# Patient Record
Sex: FEMALE | Race: WHITE | NOT HISPANIC OR LATINO | Employment: OTHER | URBAN - METROPOLITAN AREA
[De-identification: names, ages, dates, MRNs, and addresses within clinical notes are randomized per-mention and may not be internally consistent; named-entity substitution may affect disease eponyms.]

---

## 2023-10-11 PROBLEM — N18.30 CHRONIC KIDNEY DISEASE (CKD), STAGE III (MODERATE) (MULTI): Status: ACTIVE | Noted: 2023-10-11

## 2023-10-11 PROBLEM — B36.9 SUPERFICIAL FUNGAL INFECTION OF SKIN: Status: ACTIVE | Noted: 2023-10-11

## 2023-10-11 PROBLEM — I10 HYPERTENSION: Status: ACTIVE | Noted: 2023-10-11

## 2023-10-11 PROBLEM — R92.8 ABNORMAL FINDING ON MAMMOGRAPHY: Status: ACTIVE | Noted: 2023-10-11

## 2023-10-11 PROBLEM — M54.50 LOW BACK PAIN: Status: ACTIVE | Noted: 2023-10-11

## 2023-10-11 PROBLEM — M51.369 LUMBAR DEGENERATIVE DISC DISEASE: Status: ACTIVE | Noted: 2023-10-11

## 2023-10-11 PROBLEM — M41.80 LEVOSCOLIOSIS: Status: ACTIVE | Noted: 2023-10-11

## 2023-10-11 PROBLEM — M10.9 GOUT: Status: ACTIVE | Noted: 2023-10-11

## 2023-10-11 PROBLEM — R53.83 FATIGUE: Status: ACTIVE | Noted: 2023-10-11

## 2023-10-11 PROBLEM — M51.36 LUMBAR DEGENERATIVE DISC DISEASE: Status: ACTIVE | Noted: 2023-10-11

## 2023-10-11 RX ORDER — LOSARTAN POTASSIUM 100 MG/1
100 TABLET ORAL
COMMUNITY

## 2023-10-11 RX ORDER — CYCLOBENZAPRINE HCL 5 MG
1 TABLET ORAL NIGHTLY
COMMUNITY
End: 2023-10-12 | Stop reason: WASHOUT

## 2023-10-11 RX ORDER — POTASSIUM CITRATE 10 MEQ/1
10 TABLET, EXTENDED RELEASE ORAL
COMMUNITY

## 2023-10-11 RX ORDER — NYSTATIN 100000 U/G
CREAM TOPICAL 3 TIMES DAILY
COMMUNITY
Start: 2022-06-21

## 2023-10-12 ENCOUNTER — OFFICE VISIT (OUTPATIENT)
Dept: CARDIOLOGY | Facility: CLINIC | Age: 78
End: 2023-10-12
Payer: MEDICARE

## 2023-10-12 VITALS
WEIGHT: 150 LBS | HEART RATE: 95 BPM | HEIGHT: 67 IN | SYSTOLIC BLOOD PRESSURE: 122 MMHG | BODY MASS INDEX: 23.54 KG/M2 | DIASTOLIC BLOOD PRESSURE: 80 MMHG

## 2023-10-12 DIAGNOSIS — I49.1 PREMATURE ATRIAL BEAT: Primary | ICD-10-CM

## 2023-10-12 PROCEDURE — 99204 OFFICE O/P NEW MOD 45 MIN: CPT | Performed by: INTERNAL MEDICINE

## 2023-10-12 PROCEDURE — 3074F SYST BP LT 130 MM HG: CPT | Performed by: INTERNAL MEDICINE

## 2023-10-12 PROCEDURE — 3079F DIAST BP 80-89 MM HG: CPT | Performed by: INTERNAL MEDICINE

## 2023-10-12 PROCEDURE — 1159F MED LIST DOCD IN RCRD: CPT | Performed by: INTERNAL MEDICINE

## 2023-10-12 PROCEDURE — 1036F TOBACCO NON-USER: CPT | Performed by: INTERNAL MEDICINE

## 2023-10-12 RX ORDER — SODIUM BICARBONATE 650 MG/1
650 TABLET ORAL 2 TIMES DAILY
COMMUNITY

## 2023-10-12 NOTE — LETTER
October 13, 2023     Rick Barfield MD  [address]  [facility] [address]    Patient: Chelsea Steen   YOB: 1945   Date of Visit: 10/12/2023       Dear Dr. Rick Barfield MD:    Thank you for referring Chelsea Steen to me for evaluation. Below are my notes for this consultation.  If you have questions, please do not hesitate to call me. I look forward to following your patient along with you.       Sincerely,     Hernan Rodrigues MD      CC: Giovany Cerna MD  ______________________________________________________________________________________    Electrophysiology New Patient Referral    Chelsea Steen is a 78 y.o. year old female patient with CKD stage 3 and palpitations. She recently was seen by her PCP and was noted to have missed beat and was referred to Dr More who ordered monitor which showed elevated PVC and PAC burden.     Patient here for evaluation for abnormal monitor results. She reports she has been feeling well though does notice her heart beat when she lies down at night and in the morning. She denies feeling like her heart  is skipping or racing. She also denies lightheadedness, syncope, or near syncope.     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 06/11/2014    History of carcinoma in situ of breast   • Personal history of other benign neoplasm 06/11/2014    History of uterine leiomyoma      Tobacco Quit 2012, prior 1 packs/day for 30 years, Alcohol Social, Caffeine use  Denies, Drug use  Denies    FamHx:   Family History   Problem Relation Name Age of Onset   • Pneumonia Mother     • Other (CARDIAC DISORDER) Father         No Known Allergies     Outpatient Medications:  Current Outpatient Medications   Medication Instructions   • AMLODIPINE BESYLATE,  "BULK, MISC 2.5 mg, oral, Daily   • cinacalcet HCl (CINACALCET ORAL)    • ergocalciferol, vitamin D2, (VITAMIN D2 ORAL)    • FUROSEMIDE ORAL    • losartan (COZAAR) 100 mg, oral   • nystatin (Mycostatin) cream Topical, 3 times daily, Apply to affected areas 2-3 times daily   • potassium citrate CR (Urocit-K-10) 10 mEq ER tablet 10 mEq, oral, 2 times daily with meals   • sodium bicarbonate 650 mg, oral, 2 times daily         Last Recorded Vitals: .vital      4/27/2021     9:18 AM 5/18/2021     9:07 AM 5/12/2022    11:27 AM 7/14/2022     1:10 PM 5/8/2023     4:10 PM 10/12/2023     1:08 PM   Vitals   Systolic 138 124 136 138 142 122   Diastolic 80 82 74 72 80 80   Heart Rate 63 70 83 72 56 95   Temp 36.5 °C (97.7 °F) 36.7 °C (98 °F) 36.3 °C (97.4 °F) 36.4 °C (97.6 °F) 35.4 °C (95.7 °F)    Resp  6       Height (in) 1.702 m (5' 7\")  1.702 m (5' 7\") 1.702 m (5' 7\") 1.702 m (5' 7\") 1.702 m (5' 7\")   Weight (lb) 178 173.38 171.13 160.25 152.38 150   BMI 27.88 kg/m2 27.15 kg/m2 26.8 kg/m2 25.1 kg/m2 23.87 kg/m2 23.49 kg/m2   BSA (m2) 1.95 m2 1.93 m2 1.92 m2 1.85 m2 1.81 m2 1.79 m2   Visit Report      Report    Visit Vitals  /80   Pulse 95   Ht 1.702 m (5' 7\")   Wt 68 kg (150 lb)   BMI 23.49 kg/m²   Smoking Status Never   BSA 1.79 m²        Physical Exam  Constitutional:       Appearance: Normal appearance.   Cardiovascular:      Rate and Rhythm: Normal rate and regular rhythm.      Pulses: Normal pulses.      Heart sounds: Normal heart sounds.   Pulmonary:      Effort: Pulmonary effort is normal.      Breath sounds: Normal breath sounds.   Musculoskeletal:         General: Normal range of motion.      Right lower leg: No edema.      Left lower leg: No edema.   Skin:     General: Skin is warm and dry.   Neurological:      Mental Status: She is alert and oriented to person, place, and time.   Psychiatric:         Mood and Affect: Mood normal.       My Interpretation of Reviewed Study(s):  Stress Test (September/2023): DSE " which was negative for ischemia with normal LV function of 62%.  Prior ECGs (reviewed and my interpretation): ECG today sinus rhythm with rate of 95 bpm.     Lab Results   Component Value Date    WBC 9.0 05/18/2021    HGB 13.2 05/18/2021    HCT 38.2 05/18/2021     05/18/2021    ALT 12 05/18/2021    AST 13 05/18/2021     05/18/2021    K 3.9 05/18/2021     05/18/2021    CREATININE 1.38 (H) 05/18/2021    BUN 44 (H) 05/18/2021    CO2 22 05/18/2021    TSH 1.87 05/18/2021     Assessment  Patient with abnormal monitor. ECG today sinus rhythm with no ectopy. Recent Dobutamine stress echo showed normal LV function with no ischemia. Monitor showed sinus rhythm with average heart rate of 83 bpm with 7.6% PVC burden and 10.4 %PAC burden. Patient is asymptomatic PACs and PVCs with normal LV function. We provided reassurance. We discussed stopping amlodipine and starting low dose beta blocker to reduce ectopy.  Patient prefers to discuss with nephrology prior to switching to beta blocker.     PLAN  Recommend stopping amlodipine and starting metoprolol suc 25mg daily  Patient will continue to follow up with general cardiology and/or PCP. She can be referred back if further concern arises       Exclusive of any other services or procedures performed, I, Hernan Rodrigues MD   , spent 40 minutes in duration for this visit today.  This time consisted of chart review, obtaining history, and/or performing the exam as documented above as well as documenting the clinical information for the encounter in the electronic record, discussing treatment options, plans, and/or goals with patient, family, and/or caregiver, refilling medications, updating the electronic record, ordering medicines, lab work, imaging, referrals, and/or procedures as documented above and communicating with other [facility] professionals. I have discussed the results of laboratory, radiology, and cardiology studies with the patient and  their family/caregiver.

## 2023-10-12 NOTE — LETTER
October 13, 2023     Rick Barfield MD  [address]  [facility] [address]    Patient: Chelsea Steen   YOB: 1945   Date of Visit: 10/12/2023       Dear Dr. Rick Barfield MD:    Thank you for referring Chelsea Steen to me for evaluation. Below are my notes for this consultation.  If you have questions, please do not hesitate to call me. I look forward to following your patient along with you.       Sincerely,     Hernan Rodrigues MD      CC: No Recipients  ______________________________________________________________________________________    Electrophysiology New Patient Referral    Chelsea Steen is a 78 y.o. year old female patient with CKD stage 3 and palpitations. She recently was seen by her PCP and was noted to have missed beat and was referred to Dr More who ordered monitor which showed elevated PVC and PAC burden.     Patient here for evaluation for abnormal monitor results. She reports she has been feeling well though does notice her heart beat when she lies down at night and in the morning. She denies feeling like her heart  is skipping or racing. She also denies lightheadedness, syncope, or near syncope.     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 06/11/2014    History of carcinoma in situ of breast   • Personal history of other benign neoplasm 06/11/2014    History of uterine leiomyoma      Tobacco Quit 2012, prior 1 packs/day for 30 years, Alcohol Social, Caffeine use  Denies, Drug use  Denies    FamHx:   Family History   Problem Relation Name Age of Onset   • Pneumonia Mother     • Other (CARDIAC DISORDER) Father         No Known Allergies     Outpatient Medications:  Current Outpatient Medications   Medication Instructions   • AMLODIPINE BESYLATE, BULK,  "MISC 2.5 mg, oral, Daily   • cinacalcet HCl (CINACALCET ORAL)    • ergocalciferol, vitamin D2, (VITAMIN D2 ORAL)    • FUROSEMIDE ORAL    • losartan (COZAAR) 100 mg, oral   • nystatin (Mycostatin) cream Topical, 3 times daily, Apply to affected areas 2-3 times daily   • potassium citrate CR (Urocit-K-10) 10 mEq ER tablet 10 mEq, oral, 2 times daily with meals   • sodium bicarbonate 650 mg, oral, 2 times daily         Last Recorded Vitals: .vital      4/27/2021     9:18 AM 5/18/2021     9:07 AM 5/12/2022    11:27 AM 7/14/2022     1:10 PM 5/8/2023     4:10 PM 10/12/2023     1:08 PM   Vitals   Systolic 138 124 136 138 142 122   Diastolic 80 82 74 72 80 80   Heart Rate 63 70 83 72 56 95   Temp 36.5 °C (97.7 °F) 36.7 °C (98 °F) 36.3 °C (97.4 °F) 36.4 °C (97.6 °F) 35.4 °C (95.7 °F)    Resp  6       Height (in) 1.702 m (5' 7\")  1.702 m (5' 7\") 1.702 m (5' 7\") 1.702 m (5' 7\") 1.702 m (5' 7\")   Weight (lb) 178 173.38 171.13 160.25 152.38 150   BMI 27.88 kg/m2 27.15 kg/m2 26.8 kg/m2 25.1 kg/m2 23.87 kg/m2 23.49 kg/m2   BSA (m2) 1.95 m2 1.93 m2 1.92 m2 1.85 m2 1.81 m2 1.79 m2   Visit Report      Report    Visit Vitals  /80   Pulse 95   Ht 1.702 m (5' 7\")   Wt 68 kg (150 lb)   BMI 23.49 kg/m²   Smoking Status Never   BSA 1.79 m²        Physical Exam  Constitutional:       Appearance: Normal appearance.   Cardiovascular:      Rate and Rhythm: Normal rate and regular rhythm.      Pulses: Normal pulses.      Heart sounds: Normal heart sounds.   Pulmonary:      Effort: Pulmonary effort is normal.      Breath sounds: Normal breath sounds.   Musculoskeletal:         General: Normal range of motion.      Right lower leg: No edema.      Left lower leg: No edema.   Skin:     General: Skin is warm and dry.   Neurological:      Mental Status: She is alert and oriented to person, place, and time.   Psychiatric:         Mood and Affect: Mood normal.       My Interpretation of Reviewed Study(s):  Stress Test (September/2023): DSE which " was negative for ischemia with normal LV function of 62%.  Prior ECGs (reviewed and my interpretation): ECG today sinus rhythm with rate of 95 bpm.     Lab Results   Component Value Date    WBC 9.0 05/18/2021    HGB 13.2 05/18/2021    HCT 38.2 05/18/2021     05/18/2021    ALT 12 05/18/2021    AST 13 05/18/2021     05/18/2021    K 3.9 05/18/2021     05/18/2021    CREATININE 1.38 (H) 05/18/2021    BUN 44 (H) 05/18/2021    CO2 22 05/18/2021    TSH 1.87 05/18/2021     Assessment  Patient with abnormal monitor. ECG today sinus rhythm with no ectopy. Recent Dobutamine stress echo showed normal LV function with no ischemia. Monitor showed sinus rhythm with average heart rate of 83 bpm with 7.6% PVC burden and 10.4 %PAC burden. Patient is asymptomatic PACs and PVCs with normal LV function. We provided reassurance. We discussed stopping amlodipine and starting low dose beta blocker to reduce ectopy.  Patient prefers to discuss with nephrology prior to switching to beta blocker.     PLAN  Recommend stopping amlodipine and starting metoprolol suc 25mg daily  Patient will continue to follow up with general cardiology and/or PCP. She can be referred back if further concern arises       Exclusive of any other services or procedures performed, I, Hernan Rodrigues MD   , spent 40 minutes in duration for this visit today.  This time consisted of chart review, obtaining history, and/or performing the exam as documented above as well as documenting the clinical information for the encounter in the electronic record, discussing treatment options, plans, and/or goals with patient, family, and/or caregiver, refilling medications, updating the electronic record, ordering medicines, lab work, imaging, referrals, and/or procedures as documented above and communicating with other [facility] professionals. I have discussed the results of laboratory, radiology, and cardiology studies with the patient and their  family/caregiver.

## 2023-10-12 NOTE — PROGRESS NOTES
Electrophysiology New Patient Referral    Chelsea Steen is a 78 y.o. year old female patient with CKD stage 3 and palpitations. She recently was seen by her PCP and was noted to have missed beat and was referred to Dr More who ordered monitor which showed elevated PVC and PAC burden.     Patient here for evaluation for abnormal monitor results. She reports she has been feeling well though does notice her heart beat when she lies down at night and in the morning. She denies feeling like her heart  is skipping or racing. She also denies lightheadedness, syncope, or near syncope.     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 06/11/2014    History of carcinoma in situ of breast    Personal history of other benign neoplasm 06/11/2014    History of uterine leiomyoma      Tobacco Quit 2012, prior 1 packs/day for 30 years, Alcohol Social, Caffeine use  Denies, Drug use  Denies    FamHx:   Family History   Problem Relation Name Age of Onset    Pneumonia Mother      Other (CARDIAC DISORDER) Father         No Known Allergies     Outpatient Medications:  Current Outpatient Medications   Medication Instructions    AMLODIPINE BESYLATE, BULK, MISC 2.5 mg, oral, Daily    cinacalcet HCl (CINACALCET ORAL)     ergocalciferol, vitamin D2, (VITAMIN D2 ORAL)     FUROSEMIDE ORAL     losartan (COZAAR) 100 mg, oral    nystatin (Mycostatin) cream Topical, 3 times daily, Apply to affected areas 2-3 times daily    potassium citrate CR (Urocit-K-10) 10 mEq ER tablet 10 mEq, oral, 2 times daily with meals    sodium bicarbonate 650 mg, oral, 2 times daily         Last Recorded Vitals: .vital      4/27/2021     9:18 AM 5/18/2021     9:07 AM 5/12/2022    11:27 AM 7/14/2022     1:10 PM 5/8/2023     4:10 PM 10/12/2023     1:08 PM   Vitals   Systolic 138 124 136 138  "142 122   Diastolic 80 82 74 72 80 80   Heart Rate 63 70 83 72 56 95   Temp 36.5 °C (97.7 °F) 36.7 °C (98 °F) 36.3 °C (97.4 °F) 36.4 °C (97.6 °F) 35.4 °C (95.7 °F)    Resp  6       Height (in) 1.702 m (5' 7\")  1.702 m (5' 7\") 1.702 m (5' 7\") 1.702 m (5' 7\") 1.702 m (5' 7\")   Weight (lb) 178 173.38 171.13 160.25 152.38 150   BMI 27.88 kg/m2 27.15 kg/m2 26.8 kg/m2 25.1 kg/m2 23.87 kg/m2 23.49 kg/m2   BSA (m2) 1.95 m2 1.93 m2 1.92 m2 1.85 m2 1.81 m2 1.79 m2   Visit Report      Report    Visit Vitals  /80   Pulse 95   Ht 1.702 m (5' 7\")   Wt 68 kg (150 lb)   BMI 23.49 kg/m²   Smoking Status Never   BSA 1.79 m²        Physical Exam  Constitutional:       Appearance: Normal appearance.   Cardiovascular:      Rate and Rhythm: Normal rate and regular rhythm.      Pulses: Normal pulses.      Heart sounds: Normal heart sounds.   Pulmonary:      Effort: Pulmonary effort is normal.      Breath sounds: Normal breath sounds.   Musculoskeletal:         General: Normal range of motion.      Right lower leg: No edema.      Left lower leg: No edema.   Skin:     General: Skin is warm and dry.   Neurological:      Mental Status: She is alert and oriented to person, place, and time.   Psychiatric:         Mood and Affect: Mood normal.       My Interpretation of Reviewed Study(s):  Stress Test (September/2023): DSE which was negative for ischemia with normal LV function of 62%.  Prior ECGs (reviewed and my interpretation): ECG today sinus rhythm with rate of 95 bpm.     Lab Results   Component Value Date    WBC 9.0 05/18/2021    HGB 13.2 05/18/2021    HCT 38.2 05/18/2021     05/18/2021    ALT 12 05/18/2021    AST 13 05/18/2021     05/18/2021    K 3.9 05/18/2021     05/18/2021    CREATININE 1.38 (H) 05/18/2021    BUN 44 (H) 05/18/2021    CO2 22 05/18/2021    TSH 1.87 05/18/2021     Assessment  Patient with abnormal monitor. ECG today sinus rhythm with no ectopy. Recent Dobutamine stress echo showed normal LV " function with no ischemia. Monitor showed sinus rhythm with average heart rate of 83 bpm with 7.6% PVC burden and 10.4 %PAC burden. Patient is asymptomatic PACs and PVCs with normal LV function. We provided reassurance. We discussed stopping amlodipine and starting low dose beta blocker to reduce ectopy.  Patient prefers to discuss with nephrology prior to switching to beta blocker.     PLAN  Recommend stopping amlodipine and starting metoprolol suc 25mg daily  Patient will continue to follow up with general cardiology and/or PCP. She can be referred back if further concern arises       Exclusive of any other services or procedures performed, IHernan MD   , spent 40 minutes in duration for this visit today.  This time consisted of chart review, obtaining history, and/or performing the exam as documented above as well as documenting the clinical information for the encounter in the electronic record, discussing treatment options, plans, and/or goals with patient, family, and/or caregiver, refilling medications, updating the electronic record, ordering medicines, lab work, imaging, referrals, and/or procedures as documented above and communicating with other [facility] professionals. I have discussed the results of laboratory, radiology, and cardiology studies with the patient and their family/caregiver.

## 2024-03-28 ENCOUNTER — APPOINTMENT (OUTPATIENT)
Dept: PRIMARY CARE | Facility: CLINIC | Age: 79
End: 2024-03-28
Payer: COMMERCIAL

## 2024-12-19 ENCOUNTER — PATIENT OUTREACH (OUTPATIENT)
Dept: PRIMARY CARE | Facility: CLINIC | Age: 79
End: 2024-12-19
Payer: MEDICARE

## 2024-12-19 NOTE — PROGRESS NOTES
Discharge facility: [facility]  Discharge diagnosis:  Atrial fibrillation with RVR (HCC)  Acute kidney injury (HCC)  Generalized weakness    Admission date: 12/14/24  Discharge date: 12/18/24    PCP Appointment Date:Unsuccessful attempts x2 to reach patient for PCP Follow-up, message sent to office  Unable to confirm PCP    Specialist Appointment Date: none noted in EMR  Hospital Encounter and Summary:  not available at this time       Two attempts were made to reach patient within two business days after discharge. Voicemail left with contact information for patient to call back with any non-emergent questions or concerns.

## 2025-01-07 ENCOUNTER — PATIENT OUTREACH (OUTPATIENT)
Dept: PRIMARY CARE | Facility: CLINIC | Age: 80
End: 2025-01-07
Payer: MEDICARE

## 2025-01-07 NOTE — PROGRESS NOTES
Unable to reach patient for call back after recent hospitalization.    Both listed phone numbers are invalid at this time.  If no voicemail available call attempts x 2 were made to contact the patient to assist with any questions or concerns patient may have.